# Patient Record
Sex: FEMALE | Race: WHITE | NOT HISPANIC OR LATINO | URBAN - METROPOLITAN AREA
[De-identification: names, ages, dates, MRNs, and addresses within clinical notes are randomized per-mention and may not be internally consistent; named-entity substitution may affect disease eponyms.]

---

## 2023-02-20 LAB
ALANINE AMINOTRANSFERASE (SGPT) (U/L) IN SER/PLAS: 34 U/L (ref 7–45)
ALBUMIN (G/DL) IN SER/PLAS: 4.6 G/DL (ref 3.4–5)
ALKALINE PHOSPHATASE (U/L) IN SER/PLAS: 89 U/L (ref 33–136)
ASPARTATE AMINOTRANSFERASE (SGOT) (U/L) IN SER/PLAS: 19 U/L (ref 9–39)
BILIRUBIN DIRECT (MG/DL) IN SER/PLAS: 0.2 MG/DL (ref 0–0.3)
BILIRUBIN TOTAL (MG/DL) IN SER/PLAS: 1.2 MG/DL (ref 0–1.2)
PROTEIN TOTAL: 7.6 G/DL (ref 6.4–8.2)

## 2023-02-21 LAB — HEPATITIS B VIRUS SURFACE AG PRESENCE IN SERUM: NONREACTIVE

## 2023-02-23 LAB
HBV DNA PCR COMMENT: NORMAL
HBV DNA QUANT PCR IU/ML: NOT DETECTED IU/ML
HBV DNA QUANT PCR LOG: NORMAL LOG IU/ML

## 2023-07-27 LAB
AB TO ADALIMUMAB(ATA) CONC.: 3.1 U/ML
ADA RESULTS: ABNORMAL
ADALIMUMAB(ADA) CONC.: 3.4 UG/ML

## 2023-08-07 LAB
ALANINE AMINOTRANSFERASE (SGPT) (U/L) IN SER/PLAS: 41 U/L (ref 7–45)
ALBUMIN (G/DL) IN SER/PLAS: 4.3 G/DL (ref 3.4–5)
ALKALINE PHOSPHATASE (U/L) IN SER/PLAS: 89 U/L (ref 33–136)
ASPARTATE AMINOTRANSFERASE (SGOT) (U/L) IN SER/PLAS: 26 U/L (ref 9–39)
BILIRUBIN DIRECT (MG/DL) IN SER/PLAS: 0.2 MG/DL (ref 0–0.3)
BILIRUBIN TOTAL (MG/DL) IN SER/PLAS: 1.2 MG/DL (ref 0–1.2)
HEPATITIS B VIRUS SURFACE AG PRESENCE IN SERUM: NONREACTIVE
PROTEIN TOTAL: 7.2 G/DL (ref 6.4–8.2)

## 2023-10-13 DIAGNOSIS — R79.89 ELEVATED LIVER FUNCTION TESTS: Primary | ICD-10-CM

## 2023-10-13 PROBLEM — L02.411 ABSCESS OF RIGHT AXILLA: Status: ACTIVE | Noted: 2023-10-13

## 2023-10-13 PROBLEM — K82.8 BILIARY DYSKINESIA: Status: ACTIVE | Noted: 2023-10-13

## 2023-10-13 PROBLEM — K76.0 HEPATIC STEATOSIS: Status: ACTIVE | Noted: 2023-10-13

## 2023-10-13 PROBLEM — E66.9 OBESITY: Status: ACTIVE | Noted: 2023-10-13

## 2023-10-13 PROBLEM — R74.01 ELEVATED ALT MEASUREMENT: Status: ACTIVE | Noted: 2023-10-13

## 2023-10-13 PROBLEM — L02.412 ABSCESS OF AXILLA, LEFT: Status: ACTIVE | Noted: 2023-10-13

## 2023-10-13 PROBLEM — H69.90 EUSTACHIAN TUBE DYSFUNCTION: Status: ACTIVE | Noted: 2023-10-13

## 2023-10-13 PROBLEM — H90.3 BILATERAL SENSORINEURAL HEARING LOSS: Status: ACTIVE | Noted: 2023-10-13

## 2023-10-13 PROBLEM — K14.6 TONGUE SORE: Status: ACTIVE | Noted: 2023-10-13

## 2023-10-13 PROBLEM — H91.90 HEARING LOSS: Status: ACTIVE | Noted: 2023-10-13

## 2023-10-13 PROBLEM — E55.9 VITAMIN D DEFICIENCY: Status: ACTIVE | Noted: 2023-10-13

## 2023-10-13 PROBLEM — H92.09 OTALGIA: Status: ACTIVE | Noted: 2023-10-13

## 2023-10-13 PROBLEM — J32.9 CHRONIC SINUSITIS: Status: ACTIVE | Noted: 2023-10-13

## 2023-10-13 PROBLEM — R06.83 SNORING: Status: ACTIVE | Noted: 2023-10-13

## 2023-10-13 PROBLEM — R59.1 LYMPHADENOPATHY: Status: ACTIVE | Noted: 2023-10-13

## 2023-10-13 PROBLEM — B37.2 SKIN CANDIDIASIS: Status: ACTIVE | Noted: 2023-10-13

## 2023-10-13 PROBLEM — M22.41 CHONDROMALACIA OF RIGHT PATELLA: Status: ACTIVE | Noted: 2023-10-13

## 2023-10-13 PROBLEM — R04.0 BLEEDING NOSE: Status: ACTIVE | Noted: 2023-10-13

## 2023-10-13 PROBLEM — R73.01 ELEVATED FASTING BLOOD SUGAR: Status: ACTIVE | Noted: 2023-10-13

## 2023-10-13 PROBLEM — H93.13 BILATERAL TINNITUS: Status: ACTIVE | Noted: 2023-10-13

## 2023-10-13 RX ORDER — MULTIVITAMIN
1 TABLET ORAL DAILY
COMMUNITY
Start: 2022-01-06

## 2023-10-13 RX ORDER — LANOLIN ALCOHOL/MO/W.PET/CERES
1 CREAM (GRAM) TOPICAL DAILY
COMMUNITY
Start: 2022-01-06

## 2023-10-13 RX ORDER — ELECTROLYTES/DEXTROSE
SOLUTION, ORAL ORAL
COMMUNITY
Start: 2022-01-06

## 2023-10-13 RX ORDER — OXYMETAZOLINE HYDROCHLORIDE 0.05 G/100ML
2 SPRAY NASAL 2 TIMES DAILY PRN
COMMUNITY
Start: 2023-06-05

## 2023-10-13 RX ORDER — FLUTICASONE PROPIONATE 0.5 MG/G
CREAM TOPICAL 2 TIMES DAILY PRN
COMMUNITY

## 2023-10-13 RX ORDER — OXYCODONE AND ACETAMINOPHEN 5; 325 MG/1; MG/1
1 TABLET ORAL EVERY 6 HOURS
COMMUNITY
Start: 2017-07-14

## 2023-10-13 RX ORDER — TRIAMCINOLONE ACETONIDE 1 MG/G
CREAM TOPICAL
COMMUNITY
Start: 2023-09-13

## 2023-10-13 RX ORDER — ERGOCALCIFEROL 1.25 MG/1
50000 CAPSULE ORAL
COMMUNITY
Start: 2017-09-27

## 2023-10-14 ENCOUNTER — LAB (OUTPATIENT)
Dept: LAB | Facility: LAB | Age: 63
End: 2023-10-14
Payer: MEDICARE

## 2023-10-14 DIAGNOSIS — R79.89 ELEVATED LIVER FUNCTION TESTS: ICD-10-CM

## 2023-10-14 LAB
ALBUMIN SERPL BCP-MCNC: 4.3 G/DL (ref 3.4–5)
ALP SERPL-CCNC: 100 U/L (ref 33–136)
ALT SERPL W P-5'-P-CCNC: 41 U/L (ref 7–45)
AST SERPL W P-5'-P-CCNC: 23 U/L (ref 9–39)
BILIRUB DIRECT SERPL-MCNC: 0.2 MG/DL (ref 0–0.3)
BILIRUB SERPL-MCNC: 1.1 MG/DL (ref 0–1.2)
HBV SURFACE AG SERPL QL IA: NONREACTIVE
PROT SERPL-MCNC: 7.3 G/DL (ref 6.4–8.2)

## 2023-10-14 PROCEDURE — 87340 HEPATITIS B SURFACE AG IA: CPT

## 2023-10-14 PROCEDURE — 80076 HEPATIC FUNCTION PANEL: CPT

## 2023-10-14 PROCEDURE — 80145 DRUG ASSAY ADALIMUMAB: CPT

## 2023-10-14 PROCEDURE — 36415 COLL VENOUS BLD VENIPUNCTURE: CPT

## 2023-10-14 PROCEDURE — 87517 HEPATITIS B DNA QUANT: CPT

## 2023-10-16 ENCOUNTER — OFFICE VISIT (OUTPATIENT)
Dept: ENDOCRINOLOGY | Facility: CLINIC | Age: 63
End: 2023-10-16
Payer: MEDICARE

## 2023-10-16 VITALS
BODY MASS INDEX: 39.38 KG/M2 | WEIGHT: 214 LBS | SYSTOLIC BLOOD PRESSURE: 114 MMHG | DIASTOLIC BLOOD PRESSURE: 68 MMHG | HEIGHT: 62 IN

## 2023-10-16 DIAGNOSIS — R53.83 FATIGUE, UNSPECIFIED TYPE: ICD-10-CM

## 2023-10-16 DIAGNOSIS — E88.810 METABOLIC SYNDROME: Primary | ICD-10-CM

## 2023-10-16 DIAGNOSIS — E78.5 DYSLIPIDEMIA: ICD-10-CM

## 2023-10-16 DIAGNOSIS — K76.0 HEPATIC STEATOSIS: ICD-10-CM

## 2023-10-16 PROCEDURE — 99205 OFFICE O/P NEW HI 60 MIN: CPT | Performed by: INTERNAL MEDICINE

## 2023-10-16 PROCEDURE — 1125F AMNT PAIN NOTED PAIN PRSNT: CPT | Performed by: INTERNAL MEDICINE

## 2023-10-16 RX ORDER — POTASSIUM CHLORIDE 750 MG/1
10 TABLET, FILM COATED, EXTENDED RELEASE ORAL 2 TIMES DAILY
Qty: 60 TABLET | Refills: 5 | Status: SHIPPED | OUTPATIENT
Start: 2023-10-16 | End: 2023-10-17 | Stop reason: SDUPTHER

## 2023-10-16 NOTE — PROGRESS NOTES
Patient ID: Arcelia Cook  New Patient Visit (Endocrine consult. Here for weight loss.).  HPI  The patient is referred for evaluation for PSMF.    This is a 63-year-old female who has had a problem with weight for most of her life.    She has lost weight in the past with a more balanced diet coupled with phentermine.    Weight is complicated by likely insulin resistance with hunger postprandial fatigue fluid retention dyslipidemia hepatic steatosis.    She avoids skipping meals.  Does stress eating, boredom eating, late night eating, between meals eating and snacking.    She is not currently exercising.    She has a past history of hidradenitis suppurativa hepatitis kidney stones cholecystectomy no gout.    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    Family history positive for diabetes in her father sister.  Thyroid possibly in her mother.            ROS  Comprehensive review of systems is negative.    Objective   Physical Exam  Height 5 foot 1.5 weight 214 BMI 39.8    Alert and oriented x3  In no distress  No focal neurologic deficits  No supraclavicular, or dorsal fat  No purple striae  Integument intact  Eyes normal  ENT normal. No adenopathy  Thyroid palpable and normal. No nodules  Chest clear to auscultation  Heart sounds are normal  Abdomen nontender. Bowel sounds normal. No organomegaly  Feet are okay  Reflexes normal with normal return    Assessment/Plan     1.  Likely insulin resistance  2.  Dyslipidemia  3.  Hepatic steatosis  4.  Fatigue    We discussed the likelihood of insulin resistance its pathophysiology and its impact.    We discussed risks, benefits and alternatives to the protein sparing modified fast.    We discussed risks, including, but not limited to the potential for electrolyte imbalance which could lead to cardiac arrhythmia.  The high protein nature of the diet increasing levels of uric acid which could  lead to nephrolithiasis or gout.  The low fiber nature of the diet increasing risk for constipation.  Risk for gallstones, cold intolerance, excess skin seen with significant weight loss.      We discussed the benefits of weight loss regarding co-morbid conditions.      We also discussed alternatives to the program, including a balanced calorie restricted approach coupled with exercise and she would like to proceed.  We'll therefore set her up with the nutritionist for dietary instruction. When she starts the program, we'll add in potassium, as well as the other supplements except for the calcium and keep a close eye on electrolytes.    She'll follow up with me in 1 month, sooner as needed.

## 2023-10-17 DIAGNOSIS — E88.810 METABOLIC SYNDROME: ICD-10-CM

## 2023-10-17 LAB
ADALIMUMAB NAB TITR SER BIOASSAY: NOT DETECTED {TITER}
ADALIMUMAB SERPL-MCNC: 6.88 UG/ML
PATHOLOGY STUDY: NORMAL

## 2023-10-17 RX ORDER — POTASSIUM CHLORIDE 750 MG/1
10 TABLET, FILM COATED, EXTENDED RELEASE ORAL 2 TIMES DAILY
Qty: 60 TABLET | Refills: 5 | Status: SHIPPED | OUTPATIENT
Start: 2023-10-17 | End: 2024-10-16

## 2023-10-19 LAB
HBV DNA SERPL NAA+PROBE-ACNC: NOT DETECTED [IU]/ML
HBV DNA SERPL NAA+PROBE-LOG IU: NORMAL {LOG_IU}/ML

## 2023-10-20 NOTE — RESULT ENCOUNTER NOTE
Discussed with patient. She will review meds with dermatology. Repeat hep B screening in 3 mo while on immunosuppressant

## 2023-11-16 ENCOUNTER — OFFICE VISIT (OUTPATIENT)
Dept: ENDOCRINOLOGY | Facility: CLINIC | Age: 63
End: 2023-11-16
Payer: MEDICARE

## 2023-11-16 VITALS — SYSTOLIC BLOOD PRESSURE: 124 MMHG | BODY MASS INDEX: 37.74 KG/M2 | DIASTOLIC BLOOD PRESSURE: 78 MMHG | WEIGHT: 203 LBS

## 2023-11-16 DIAGNOSIS — E78.5 DYSLIPIDEMIA: ICD-10-CM

## 2023-11-16 DIAGNOSIS — E88.810 METABOLIC SYNDROME: Primary | ICD-10-CM

## 2023-11-16 DIAGNOSIS — K76.0 HEPATIC STEATOSIS: ICD-10-CM

## 2023-11-16 PROCEDURE — 1159F MED LIST DOCD IN RCRD: CPT | Performed by: INTERNAL MEDICINE

## 2023-11-16 PROCEDURE — 99214 OFFICE O/P EST MOD 30 MIN: CPT | Performed by: INTERNAL MEDICINE

## 2023-11-16 PROCEDURE — 1125F AMNT PAIN NOTED PAIN PRSNT: CPT | Performed by: INTERNAL MEDICINE

## 2023-11-16 NOTE — PROGRESS NOTES
Patient ID: Arcelia Cook is a 63 y.o. female who presents for Follow-up.  HPI  The patient comes in for follow up.    She is on PSMF for management of insulin resistance with postprandial fatigue hunger fluid retention dyslipidemia and hepatic steatosis.    She started the diet October 25 and has been on and off track.    She has been working on exercise.    She is now retired.    She does states she has been eating bigger portion size.    Aside from the supplements she has had no changes in her medical regimen.    Physically she has no other complaints.    ROS  Comprehensive review of systems is negative.    Objective   Physical Exam  Visit Vitals  /78      Vitals:    11/16/23 0923   Weight: 92.1 kg (203 lb)      Body mass index is 37.74 kg/m².      Weight 203 down 11 pounds    Eyes normal  ENT normal. No adenopathy  Thyroid palpable and normal. No nodules  Chest clear to auscultation  Heart sounds are normal  Abdomen nontender. Bowel sounds normal. No organomegaly  Feet are okay    Assessment/Plan     1.  Likely insulin resistance  2.  Dyslipidemia  3.  Hepatic steatosis  4.  Fatigue    She is going to work to tighten things back up.  We discussed strategies for success. Planning ahead, taking things a week at a time and planning the week ahead of time.    We discussed strategies for the holidays.    She will continue her current regimen.    She will work on exercise.    We will hold on blood work today.    Follow up with the nutritionist in 1 month, me in 2 months, sooner as needed.

## 2023-12-05 ENCOUNTER — NUTRITION (OUTPATIENT)
Dept: ENDOCRINOLOGY | Facility: CLINIC | Age: 63
End: 2023-12-05
Payer: MEDICARE

## 2023-12-05 VITALS — WEIGHT: 202 LBS | BODY MASS INDEX: 37.55 KG/M2

## 2023-12-05 NOTE — PROGRESS NOTES
"Patient presents for PSMF in-person followup visit. Her current weight is 202lbs. She admits to getting off track with the diet after  since some cravings came back. She does have an upcoming holiday party this weekend - we discussed fully resuming the diet after the party to ensure she is back in ketosis before [place]. When testing ketones prior to holiday, she was showing moderate levels. Continues taking required supplements. Exercises with friends by walking three times weekly for 1 hour at fitness center. Reviewed meal, snack and drink options including beef stick snacks. Discussed importance of avoiding those items even if labeled \"keto friendly\" as they may have larger amounts of fat. Plans to continue PSMF throughout the holidays.     Wt Readings from Last 5 Encounters:   23 91.6 kg (202 lb)   23 92.1 kg (203 lb)   10/16/23 97.1 kg (214 lb)   22 97.1 kg (214 lb)   22 97.8 kg (215 lb 8 oz)     IBW: 48 kg  Protein need calculated (1.5g/kg IBW) = 72g  Protein intake recommended: 77g = 11oz     Daily Meal Plan:   Morning:    3 oz protein          Afternoon:  4 oz protein + 1 vegetable from the list   Evenin oz protein + 1 vegetable from the list   Fluids:        64-96oz per day     Recommended supplements:  Potassium as directed with breakfast and dinner (prescription)  Fiber capsules 1-2 times daily or sugar-free fiber powder (as tolerated)   Colace (stool softener) as needed, up to 4 capsules/day  Calcium: 500-600 mg, pill form twice daily with breakfast or lunch AND dinner  Multivitamin tablet or capsule per day that is low in iron (i.e. Centrum Silver)  Magnesium: 200-250 mg, pill form with dinner or at bedtime  Bouillon cube one to two times per day as needed, if feeling dizzy   Beginning with the fourth morning of this diet, test urine every morning using Ketostix for ketone (fat) breakdown.   "

## 2024-01-04 ENCOUNTER — OFFICE VISIT (OUTPATIENT)
Dept: ENDOCRINOLOGY | Facility: CLINIC | Age: 64
End: 2024-01-04
Payer: MEDICARE

## 2024-01-04 VITALS — WEIGHT: 203 LBS | DIASTOLIC BLOOD PRESSURE: 78 MMHG | SYSTOLIC BLOOD PRESSURE: 134 MMHG | BODY MASS INDEX: 37.74 KG/M2

## 2024-01-04 DIAGNOSIS — E88.810 METABOLIC SYNDROME: Primary | ICD-10-CM

## 2024-01-04 DIAGNOSIS — K76.0 HEPATIC STEATOSIS: ICD-10-CM

## 2024-01-04 DIAGNOSIS — E78.5 DYSLIPIDEMIA: ICD-10-CM

## 2024-01-04 PROCEDURE — 1125F AMNT PAIN NOTED PAIN PRSNT: CPT | Performed by: INTERNAL MEDICINE

## 2024-01-04 PROCEDURE — 99214 OFFICE O/P EST MOD 30 MIN: CPT | Performed by: INTERNAL MEDICINE

## 2024-01-04 PROCEDURE — 1159F MED LIST DOCD IN RCRD: CPT | Performed by: INTERNAL MEDICINE

## 2024-01-04 RX ORDER — METFORMIN HYDROCHLORIDE 1000 MG/1
1000 TABLET ORAL
Qty: 60 TABLET | Refills: 5 | Status: SHIPPED | OUTPATIENT
Start: 2024-01-04 | End: 2025-01-03

## 2024-01-04 NOTE — PROGRESS NOTES
Patient ID: Arcelia Cook is a 63 y.o. female who presents for Follow-up.  HPI  The patient comes in for follow up.    She has been off track of [facility] for management of insulin resistance with postprandial fatigue hunger fluid retention dyslipidemia hepatic steatosis.    She continues to struggle with hunger and also states that making 2 separate meals in her household has been a challenge.    Her portion size has also been an issue.    She is back on track over the last week or so.    Physically she has no other complaints.    ROS  Comprehensive review of systems is negative.    Objective   Physical Exam  Visit Vitals  /78      Vitals:    01/04/24 0941   Weight: 92.1 kg (203 lb)      Body mass index is 37.74 kg/m².      Weight 203 stable    Eyes normal  ENT normal. No adenopathy  Thyroid palpable and normal. No nodules  Chest clear to auscultation  Heart sounds are normal  Abdomen nontender. Bowel sounds normal. No organomegaly  Feet are okay    Assessment/Plan     1.  Insulin resistance  2.  Dyslipidemia  3.  Fatigue  4.  Hepatic steatosis    She is going to work to tighten up her diet.  We discussed strategies for success. Planning ahead, taking things a week at a time and planning the week ahead of time.    We discussed insulin resistance again is pathophysiology and its impact.    We discussed weight loss medication options including risk benefits and alternatives.    Will add in Metformin starting at 500 mg and working up to 1000 mg BID per protocol.    Follow up with the nutritionist in 1 month, me in 2 months, sooner as needed.

## 2024-02-02 ENCOUNTER — TELEPHONE (OUTPATIENT)
Dept: GASTROENTEROLOGY | Facility: CLINIC | Age: 64
End: 2024-02-02
Payer: MEDICARE

## 2024-02-02 ENCOUNTER — LAB (OUTPATIENT)
Dept: LAB | Facility: LAB | Age: 64
End: 2024-02-02
Payer: MEDICARE

## 2024-02-02 DIAGNOSIS — R74.01 ELEVATED ALT MEASUREMENT: ICD-10-CM

## 2024-02-02 DIAGNOSIS — R74.01 ELEVATED ALT MEASUREMENT: Primary | ICD-10-CM

## 2024-02-02 LAB
ALBUMIN SERPL BCP-MCNC: 4.2 G/DL (ref 3.4–5)
ALP SERPL-CCNC: 86 U/L (ref 33–136)
ALT SERPL W P-5'-P-CCNC: 37 U/L (ref 7–45)
AST SERPL W P-5'-P-CCNC: 19 U/L (ref 9–39)
BILIRUB DIRECT SERPL-MCNC: 0.1 MG/DL (ref 0–0.3)
BILIRUB SERPL-MCNC: 0.9 MG/DL (ref 0–1.2)
HBV CORE AB SER QL: REACTIVE
HBV SURFACE AG SERPL QL IA: NONREACTIVE
PROT SERPL-MCNC: 7.3 G/DL (ref 6.4–8.2)

## 2024-02-02 PROCEDURE — 86704 HEP B CORE ANTIBODY TOTAL: CPT

## 2024-02-02 PROCEDURE — 87340 HEPATITIS B SURFACE AG IA: CPT

## 2024-02-02 PROCEDURE — 80076 HEPATIC FUNCTION PANEL: CPT

## 2024-02-02 PROCEDURE — 87517 HEPATITIS B DNA QUANT: CPT

## 2024-02-02 PROCEDURE — 36415 COLL VENOUS BLD VENIPUNCTURE: CPT

## 2024-02-02 PROCEDURE — 87350 HEPATITIS BE AG IA: CPT

## 2024-02-02 NOTE — TELEPHONE ENCOUNTER
Arcelia stopped by the office asking for the standing lab orders to be ordered again, they don't seem to be in epic. thanks

## 2024-02-05 LAB — HBV E AG SERPL QL IA: NEGATIVE

## 2024-02-06 ENCOUNTER — TELEMEDICINE CLINICAL SUPPORT (OUTPATIENT)
Dept: ENDOCRINOLOGY | Facility: CLINIC | Age: 64
End: 2024-02-06
Payer: MEDICARE

## 2024-02-06 VITALS — WEIGHT: 200 LBS | BODY MASS INDEX: 37.18 KG/M2

## 2024-02-06 NOTE — PROGRESS NOTES
[facility]. She has been off track with the diet since [place] trip in mid-January. Current weight of 200lbs, down 2lbs total since last visit in December. She has been trying to make healthier food choices overall. She tried her best to stay on track during holiday season. She does admit to GI distress with taking Metformin, encouraged to discuss with Dr. Herrera to potentially trial ER version. At this time, patient feels like a more liberalized approach would benefit her vs PSMF. She will discuss with Dr. Herrera and make a decision regarding her diet, depending on if she will start another medication. We will hold off on PSMF for the time being.    Wt Readings from Last 5 Encounters:   24 90.7 kg (200 lb)   24 92.1 kg (203 lb)   23 91.6 kg (202 lb)   23 92.1 kg (203 lb)   10/16/23 97.1 kg (214 lb)     IBW: 48 kg  Protein need calculated (1.5g/kg IBW) = 72g  Protein intake recommended: 77g = 11oz     Daily Meal Plan:   Morning:    3 oz protein          Afternoon:  4 oz protein + 1 vegetable from the list   Evenin oz protein + 1 vegetable from the list   Fluids:        64-96oz per day     Recommended supplements:  Potassium as directed with breakfast and dinner (prescription)  Fiber capsules 1-2 times daily or sugar-free fiber powder (as tolerated)   Colace (stool softener) as needed, up to 4 capsules/day  Calcium: 500-600 mg, pill form twice daily with breakfast or lunch AND dinner  Multivitamin tablet or capsule per day that is low in iron (i.e. Centrum Silver)  Magnesium: 200-250 mg, pill form with dinner or at bedtime  Bouillon cube one to two times per day as needed, if feeling dizzy   Beginning with the fourth morning of this diet, test urine every morning using Ketostix for ketone (fat) breakdown.

## 2024-03-04 ENCOUNTER — OFFICE VISIT (OUTPATIENT)
Dept: ENDOCRINOLOGY | Facility: CLINIC | Age: 64
End: 2024-03-04
Payer: MEDICARE

## 2024-03-04 VITALS
SYSTOLIC BLOOD PRESSURE: 122 MMHG | HEART RATE: 74 BPM | WEIGHT: 203 LBS | DIASTOLIC BLOOD PRESSURE: 72 MMHG | BODY MASS INDEX: 37.74 KG/M2

## 2024-03-04 DIAGNOSIS — K76.0 HEPATIC STEATOSIS: ICD-10-CM

## 2024-03-04 DIAGNOSIS — E88.810 METABOLIC SYNDROME: Primary | ICD-10-CM

## 2024-03-04 DIAGNOSIS — E78.5 DYSLIPIDEMIA: ICD-10-CM

## 2024-03-04 DIAGNOSIS — E88.810 METABOLIC SYNDROME: ICD-10-CM

## 2024-03-04 PROCEDURE — 1125F AMNT PAIN NOTED PAIN PRSNT: CPT | Performed by: INTERNAL MEDICINE

## 2024-03-04 PROCEDURE — 99214 OFFICE O/P EST MOD 30 MIN: CPT | Performed by: INTERNAL MEDICINE

## 2024-03-04 PROCEDURE — 1159F MED LIST DOCD IN RCRD: CPT | Performed by: INTERNAL MEDICINE

## 2024-03-04 RX ORDER — PHENTERMINE HYDROCHLORIDE 37.5 MG/1
37.5 CAPSULE ORAL
Qty: 30 CAPSULE | Refills: 2 | Status: SHIPPED | OUTPATIENT
Start: 2024-03-04 | End: 2024-03-05 | Stop reason: SDUPTHER

## 2024-03-04 RX ORDER — PHENTERMINE HYDROCHLORIDE 37.5 MG/1
37.5 CAPSULE ORAL
Qty: 30 CAPSULE | Refills: 2 | Status: SHIPPED | OUTPATIENT
Start: 2024-03-04 | End: 2024-03-04 | Stop reason: SDUPTHER

## 2024-03-04 NOTE — PROGRESS NOTES
Patient ID: Arcelia Cook is a 63 y.o. female who presents for Follow-up.  HPI  The patient comes in for follow up.    She has been off track of [facility] for management of insulin resistance with postprandial fatigue hunger fluid retention dyslipidemia hepatic steatosis.    She went on a cruise and gained up to 220 pounds since tighten things back up.    She continues to struggle with hunger.    She has been eating in the middle of the night.    Her portion size has also been an issue.    She tried metformin but did not tolerate it.    Physically she has no other complaints.    ROS  Comprehensive review of systems is negative.    Objective   Physical Exam  Visit Vitals  /72   Pulse 74      Vitals:    03/04/24 1105   Weight: 92.1 kg (203 lb)      Body mass index is 37.74 kg/m².      Weight 203 stable    Eyes normal  ENT normal. No adenopathy  Thyroid palpable and normal. No nodules  Chest clear to auscultation  Heart sounds are normal  Abdomen nontender. Bowel sounds normal. No organomegaly  Feet are okay    Current Outpatient Medications   Medication Sig Dispense Refill    biotin 5 mg capsule Take as directed      cholecalciferol, vitamin D3, (VITAMIN D3 ORAL) Take 1 tablet by mouth once daily.      ergocalciferol (Vitamin D-2) 1.25 MG (45021 UT) capsule Take 1 capsule (50,000 Units) by mouth every 14 (fourteen) days.      fluticasone (Cutivate) 0.05 % cream Apply topically 2 times a day as needed.      metFORMIN (Glucophage) 1,000 mg tablet Take 1 tablet (1,000 mg) by mouth 2 times a day with meals. 60 tablet 5    multivitamin tablet Take 1 tablet by mouth once daily.      oxyCODONE-acetaminophen (Percocet) 5-325 mg tablet Take 1 tablet by mouth every 6 hours.      oxymetazoline (Afrin, oxymetazoline,) 0.05 % nasal spray Administer 2 sprays into each nostril 2 times a day as needed. Do not exceed use for more than 3 days      potassium chloride CR 10 mEq ER tablet Take 1 tablet (10 mEq) by mouth 2 times a day.  Do not crush, chew, or split. 60 tablet 5    pyridoxine (Vitamin B-6) 50 mg tablet Take 1 tablet (50 mg) by mouth once daily.      triamcinolone (Kenalog) 0.1 % cream        No current facility-administered medications for this visit.       Assessment/Plan     1.  Insulin resistance  2.  Dyslipidemia  3.  Fatigue  4.  Hepatic steatosis    She will transition to a more balanced diet.    We discussed risk benefits and alternatives to weight loss medication.    Will prescribe phentermine 37.5 mg for the next 3 months with a goal of at least 5% weight loss.    I have personally reviewed the OARRS report for this patient. This report is scanned into the electronic medical record. I consider the risks of abuse, dependence, addiction and diversion. I believe that is clinically appropriate for this patient to be prescribed this medication.    She will follow-up with me in 3 months sooner as needed.

## 2024-03-05 DIAGNOSIS — E88.810 METABOLIC SYNDROME: ICD-10-CM

## 2024-03-05 RX ORDER — PHENTERMINE HYDROCHLORIDE 37.5 MG/1
37.5 CAPSULE ORAL
Qty: 30 CAPSULE | Refills: 2 | Status: SHIPPED | OUTPATIENT
Start: 2024-03-05 | End: 2024-06-04 | Stop reason: SDUPTHER

## 2024-06-04 ENCOUNTER — LAB (OUTPATIENT)
Dept: LAB | Facility: LAB | Age: 64
End: 2024-06-04
Payer: MEDICARE

## 2024-06-04 ENCOUNTER — OFFICE VISIT (OUTPATIENT)
Dept: ENDOCRINOLOGY | Facility: CLINIC | Age: 64
End: 2024-06-04
Payer: MEDICARE

## 2024-06-04 VITALS
WEIGHT: 192 LBS | HEART RATE: 68 BPM | SYSTOLIC BLOOD PRESSURE: 122 MMHG | BODY MASS INDEX: 35.69 KG/M2 | DIASTOLIC BLOOD PRESSURE: 74 MMHG

## 2024-06-04 DIAGNOSIS — E88.810 METABOLIC SYNDROME: ICD-10-CM

## 2024-06-04 DIAGNOSIS — E08.44 DIABETES MELLITUS DUE TO UNDERLYING CONDITION WITH DIABETIC AMYOTROPHY, WITHOUT LONG-TERM CURRENT USE OF INSULIN (MULTI): ICD-10-CM

## 2024-06-04 DIAGNOSIS — E08.3531: ICD-10-CM

## 2024-06-04 DIAGNOSIS — E78.5 DYSLIPIDEMIA: ICD-10-CM

## 2024-06-04 DIAGNOSIS — E88.810 METABOLIC SYNDROME: Primary | ICD-10-CM

## 2024-06-04 DIAGNOSIS — R53.83 FATIGUE, UNSPECIFIED TYPE: ICD-10-CM

## 2024-06-04 DIAGNOSIS — K76.0 HEPATIC STEATOSIS: ICD-10-CM

## 2024-06-04 LAB
ANION GAP SERPL CALC-SCNC: 13 MMOL/L (ref 10–20)
BUN SERPL-MCNC: 14 MG/DL (ref 6–23)
CALCIUM SERPL-MCNC: 9.2 MG/DL (ref 8.6–10.6)
CHLORIDE SERPL-SCNC: 106 MMOL/L (ref 98–107)
CO2 SERPL-SCNC: 29 MMOL/L (ref 21–32)
CREAT SERPL-MCNC: 0.56 MG/DL (ref 0.5–1.05)
EGFRCR SERPLBLD CKD-EPI 2021: >90 ML/MIN/1.73M*2
EST. AVERAGE GLUCOSE BLD GHB EST-MCNC: 85 MG/DL
GLUCOSE SERPL-MCNC: 94 MG/DL (ref 74–99)
HBA1C MFR BLD: 4.6 %
POTASSIUM SERPL-SCNC: 4.1 MMOL/L (ref 3.5–5.3)
SODIUM SERPL-SCNC: 144 MMOL/L (ref 136–145)
TSH SERPL-ACNC: 0.71 MIU/L (ref 0.44–3.98)

## 2024-06-04 PROCEDURE — 80048 BASIC METABOLIC PNL TOTAL CA: CPT

## 2024-06-04 PROCEDURE — 3078F DIAST BP <80 MM HG: CPT | Performed by: INTERNAL MEDICINE

## 2024-06-04 PROCEDURE — 36415 COLL VENOUS BLD VENIPUNCTURE: CPT

## 2024-06-04 PROCEDURE — 3074F SYST BP LT 130 MM HG: CPT | Performed by: INTERNAL MEDICINE

## 2024-06-04 PROCEDURE — 83036 HEMOGLOBIN GLYCOSYLATED A1C: CPT

## 2024-06-04 PROCEDURE — 99214 OFFICE O/P EST MOD 30 MIN: CPT | Performed by: INTERNAL MEDICINE

## 2024-06-04 PROCEDURE — 84443 ASSAY THYROID STIM HORMONE: CPT

## 2024-06-04 RX ORDER — PHENTERMINE HYDROCHLORIDE 37.5 MG/1
37.5 CAPSULE ORAL
Qty: 30 CAPSULE | Refills: 2 | Status: SHIPPED | OUTPATIENT
Start: 2024-06-04 | End: 2024-09-02

## 2024-06-04 NOTE — PROGRESS NOTES
Patient ID: Arcelia Cook is a 64 y.o. female who presents for Follow-up.  HPI  The patient comes in for follow up.    She tried PSMF for management of insulin resistance with postprandial fatigue hunger fluid retention dyslipidemia hepatic steatosis.    She has been following a more balanced approach and we added phentermine 37.5 mg at the last appointment.    She states that has helped her appetite.    She is had no difficulty tolerating it.    She tried metformin but did not tolerate it in the past.    Physically she has no complaints.    ROS  Comprehensive review of systems is negative.    Objective   Physical Exam  There were no vitals taken for this visit.   Vitals:    06/04/24 1100   Weight: 87.1 kg (192 lb)      Body mass index is 35.69 kg/m².      Weight 192 down 11 pounds for total of 5.4%    Eyes normal  ENT normal. No adenopathy  Thyroid palpable and normal. No nodules  Chest clear to auscultation  Heart sounds are normal  Abdomen nontender. Bowel sounds normal. No organomegaly  Feet are okay    Current Outpatient Medications   Medication Sig Dispense Refill    biotin 5 mg capsule Take as directed      cholecalciferol, vitamin D3, (VITAMIN D3 ORAL) Take 1 tablet by mouth once daily.      ergocalciferol (Vitamin D-2) 1.25 MG (93313 UT) capsule Take 1 capsule (50,000 Units) by mouth every 14 (fourteen) days.      fluticasone (Cutivate) 0.05 % cream Apply topically 2 times a day as needed.      metFORMIN (Glucophage) 1,000 mg tablet Take 1 tablet (1,000 mg) by mouth 2 times a day with meals. 60 tablet 5    multivitamin tablet Take 1 tablet by mouth once daily.      oxyCODONE-acetaminophen (Percocet) 5-325 mg tablet Take 1 tablet by mouth every 6 hours.      oxymetazoline (Afrin, oxymetazoline,) 0.05 % nasal spray Administer 2 sprays into each nostril 2 times a day as needed. Do not exceed use for more than 3 days      phentermine 37.5 mg capsule Take 1 capsule (37.5 mg) by mouth once daily in the morning.  Take before meals. 30 capsule 2    potassium chloride CR 10 mEq ER tablet Take 1 tablet (10 mEq) by mouth 2 times a day. Do not crush, chew, or split. 60 tablet 5    pyridoxine (Vitamin B-6) 50 mg tablet Take 1 tablet (50 mg) by mouth once daily.      triamcinolone (Kenalog) 0.1 % cream        No current facility-administered medications for this visit.       Assessment/Plan     1.  Insulin resistance  2.  Dyslipidemia  3.  Fatigue  4.  Hepatic steatosis    She will continue on a more balanced approach.    She will continue on the phentermine for the next 3 months.    She will work on diet and exercise.    Will check hemoglobin A1c BMP and TSH today.    She will follow-up with me in 3 months sooner as needed.

## 2024-09-05 ENCOUNTER — APPOINTMENT (OUTPATIENT)
Dept: ENDOCRINOLOGY | Facility: CLINIC | Age: 64
End: 2024-09-05
Payer: MEDICARE

## 2024-11-22 ENCOUNTER — APPOINTMENT (OUTPATIENT)
Dept: ENDOCRINOLOGY | Facility: CLINIC | Age: 64
End: 2024-11-22
Payer: MEDICARE

## 2024-11-22 VITALS
DIASTOLIC BLOOD PRESSURE: 78 MMHG | HEART RATE: 74 BPM | SYSTOLIC BLOOD PRESSURE: 122 MMHG | BODY MASS INDEX: 35.13 KG/M2 | WEIGHT: 189 LBS

## 2024-11-22 DIAGNOSIS — E88.810 METABOLIC SYNDROME: Primary | ICD-10-CM

## 2024-11-22 DIAGNOSIS — E78.5 DYSLIPIDEMIA: ICD-10-CM

## 2024-11-22 DIAGNOSIS — K76.0 HEPATIC STEATOSIS: ICD-10-CM

## 2024-11-22 DIAGNOSIS — R53.83 FATIGUE, UNSPECIFIED TYPE: ICD-10-CM

## 2024-11-22 PROCEDURE — 99214 OFFICE O/P EST MOD 30 MIN: CPT | Performed by: INTERNAL MEDICINE

## 2024-11-22 RX ORDER — PHENTERMINE HYDROCHLORIDE 37.5 MG/1
37.5 CAPSULE ORAL
Qty: 30 CAPSULE | Refills: 2 | Status: SHIPPED | OUTPATIENT
Start: 2024-11-22 | End: 2025-02-20

## 2024-11-22 NOTE — PROGRESS NOTES
Patient ID: Arcelia Cook is a 64 y.o. female who presents for Follow-up.  HPI  The patient comes in for follow up.    She was last seen June 4, 2024.    She tried PSMF for management of insulin resistance with postprandial fatigue hunger fluid retention dyslipidemia hepatic steatosis.    She does not have a diagnosis of diabetes.    She has been following a more balanced approach and we added phentermine 37.5 mg.    She states that has helped her appetite.    She is had no difficulty tolerating it except for occasional insomnia and she does tend to get hungry by evening.    She has been working and was unable to follow-up until now.    She tried metformin but did not tolerate it in the past.    Physically she has no complaints.    ROS  Comprehensive review of systems is negative.    Objective   Physical Exam  Visit Vitals  /78   Pulse 74      Vitals:    11/22/24 1551   Weight: 85.7 kg (189 lb)      Body mass index is 35.13 kg/m².      Weight 189 down 3 pounds for total of 14    Eyes normal  ENT normal. No adenopathy  Thyroid palpable and normal. No nodules  Chest clear to auscultation  Heart sounds are normal  Abdomen nontender. Bowel sounds normal. No organomegaly  Feet are okay    Current Outpatient Medications   Medication Sig Dispense Refill    biotin 5 mg capsule Take as directed      cholecalciferol, vitamin D3, (VITAMIN D3 ORAL) Take 1 tablet by mouth once daily.      ergocalciferol (Vitamin D-2) 1.25 MG (95147 UT) capsule Take 1 capsule (50,000 Units) by mouth every 14 (fourteen) days.      fluticasone (Cutivate) 0.05 % cream Apply topically 2 times a day as needed.      multivitamin tablet Take 1 tablet by mouth once daily.      oxyCODONE-acetaminophen (Percocet) 5-325 mg tablet Take 1 tablet by mouth every 6 hours.      oxymetazoline (Afrin, oxymetazoline,) 0.05 % nasal spray Administer 2 sprays into each nostril 2 times a day as needed. Do not exceed use for more than 3 days      phentermine 37.5  mg capsule Take 1 capsule (37.5 mg) by mouth once daily in the morning. Take before meals. 30 capsule 2    pyridoxine (Vitamin B-6) 50 mg tablet Take 1 tablet (50 mg) by mouth once daily.      triamcinolone (Kenalog) 0.1 % cream        No current facility-administered medications for this visit.       Assessment/Plan     1.  Insulin resistance  2.  Dyslipidemia  3.  Fatigue  4.  Hepatic steatosis    We reviewed her blood work from June.    We again discussed insulin resistance and its impact.    We discussed other options.    She wants to stick with phentermine.    Will represcribe it and we discussed that since she was able to get in until now that despite the lapse hopefully the pharmacy will fill it.

## 2025-03-20 ENCOUNTER — APPOINTMENT (OUTPATIENT)
Dept: ENDOCRINOLOGY | Facility: CLINIC | Age: 65
End: 2025-03-20
Payer: MEDICARE

## 2025-03-21 ENCOUNTER — OFFICE VISIT (OUTPATIENT)
Dept: ENDOCRINOLOGY | Facility: CLINIC | Age: 65
End: 2025-03-21
Payer: MEDICARE

## 2025-03-21 VITALS
DIASTOLIC BLOOD PRESSURE: 78 MMHG | SYSTOLIC BLOOD PRESSURE: 122 MMHG | BODY MASS INDEX: 35.69 KG/M2 | WEIGHT: 192 LBS | HEART RATE: 76 BPM

## 2025-03-21 DIAGNOSIS — E78.5 DYSLIPIDEMIA: ICD-10-CM

## 2025-03-21 DIAGNOSIS — E88.810 METABOLIC SYNDROME: Primary | ICD-10-CM

## 2025-03-21 DIAGNOSIS — K76.0 HEPATIC STEATOSIS: ICD-10-CM

## 2025-03-21 PROCEDURE — 99214 OFFICE O/P EST MOD 30 MIN: CPT | Performed by: INTERNAL MEDICINE

## 2025-03-21 RX ORDER — CLOTRIMAZOLE AND BETAMETHASONE DIPROPIONATE 10; .64 MG/G; MG/G
1 CREAM TOPICAL 2 TIMES DAILY
COMMUNITY

## 2025-03-21 RX ORDER — BIMEKIZUMAB 160 MG/ML
INJECTION, SOLUTION SUBCUTANEOUS
COMMUNITY

## 2025-03-21 RX ORDER — CLOBETASOL PROPIONATE 0.5 MG/G
OINTMENT TOPICAL 2 TIMES DAILY
COMMUNITY

## 2025-03-21 NOTE — PROGRESS NOTES
Patient ID: Arcelia Cook is a 64 y.o. female who presents for Follow-up.  HPI  The patient comes in for follow up.    She tried PSMF for management of insulin resistance with postprandial fatigue hunger fluid retention dyslipidemia hepatic steatosis.    She does not have a diagnosis of diabetes.    She has been following a more balanced approach and we added phentermine 37.5 mg.    She states that has helped her appetite.    She is had no difficulty tolerating it except for occasional insomnia and she does tend to get hungry by evening.    She has not been exercising.    She tried metformin but did not tolerate it in the past.    Physically she has no complaints.      ROS  Comprehensive review of systems is negative.    Objective   Physical Exam  Visit Vitals  /78   Pulse 76      Vitals:    03/21/25 0926   Weight: 87.1 kg (192 lb)      Body mass index is 35.69 kg/m².      Weight 192 up 3 pounds still down 11    Eyes normal  ENT normal. No adenopathy  Thyroid palpable and normal. No nodules  Chest clear to auscultation  Heart sounds are normal  Abdomen nontender. Bowel sounds normal. No organomegaly  Feet are okay    Current Outpatient Medications   Medication Sig Dispense Refill    bimekizumab-bkzx (Bimzelx Autoinjector) 160 mg/mL auto-injector Inject under the skin.      biotin 5 mg capsule Take as directed      cholecalciferol, vitamin D3, (VITAMIN D3 ORAL) Take 1 tablet by mouth once daily.      clobetasol (Temovate) 0.05 % ointment Apply topically 2 times a day.      clotrimazole-betamethasone (Lotrisone) cream Apply 1 Application topically 2 times a day.      ergocalciferol (Vitamin D-2) 1.25 MG (87942 UT) capsule Take 1 capsule (50,000 Units) by mouth every 14 (fourteen) days.      fluticasone (Cutivate) 0.05 % cream Apply topically 2 times a day as needed.      multivitamin tablet Take 1 tablet by mouth once daily.      oxyCODONE-acetaminophen (Percocet) 5-325 mg tablet Take 1 tablet by mouth every 6  hours.      oxymetazoline (Afrin, oxymetazoline,) 0.05 % nasal spray Administer 2 sprays into each nostril 2 times a day as needed. Do not exceed use for more than 3 days      phentermine 37.5 mg capsule Take 1 capsule (37.5 mg) by mouth once daily in the morning. Take before meals. 30 capsule 2    pyridoxine (Vitamin B-6) 50 mg tablet Take 1 tablet (50 mg) by mouth once daily.      triamcinolone (Kenalog) 0.1 % cream        No current facility-administered medications for this visit.       Assessment/Plan     1.  Insulin resistance  2.  Dyslipidemia  3.  Hepatic steatosis    She will continue on the phentermine.    I have personally reviewed the OARRS report for this patient. This report is scanned into the electronic medical record. I consider the risks of abuse, dependence, addiction and diversion. I believe that is clinically appropriate for this patient to be prescribed this medication.    She will work on diet and exercise.    She will follow-up with me in 3 months sooner as needed.

## 2025-06-20 ENCOUNTER — APPOINTMENT (OUTPATIENT)
Dept: PRIMARY CARE | Facility: CLINIC | Age: 65
End: 2025-06-20
Payer: MEDICARE

## 2025-07-21 ENCOUNTER — OFFICE VISIT (OUTPATIENT)
Dept: URGENT CARE | Age: 65
End: 2025-07-21
Payer: MEDICARE

## 2025-07-21 VITALS
HEART RATE: 91 BPM | HEIGHT: 63 IN | SYSTOLIC BLOOD PRESSURE: 130 MMHG | OXYGEN SATURATION: 94 % | DIASTOLIC BLOOD PRESSURE: 83 MMHG | TEMPERATURE: 97.9 F | RESPIRATION RATE: 17 BRPM | BODY MASS INDEX: 33.66 KG/M2 | WEIGHT: 190 LBS

## 2025-07-21 DIAGNOSIS — N30.01 ACUTE CYSTITIS WITH HEMATURIA: Primary | ICD-10-CM

## 2025-07-21 LAB
POC APPEARANCE, URINE: CLEAR
POC BILIRUBIN, URINE: NEGATIVE
POC BLOOD, URINE: ABNORMAL
POC COLOR, URINE: YELLOW
POC GLUCOSE, URINE: NEGATIVE MG/DL
POC KETONES, URINE: NEGATIVE MG/DL
POC LEUKOCYTES, URINE: ABNORMAL
POC NITRITE,URINE: NEGATIVE
POC PH, URINE: 6 PH
POC PROTEIN, URINE: ABNORMAL MG/DL
POC SPECIFIC GRAVITY, URINE: 1.02
POC UROBILINOGEN, URINE: 0.2 EU/DL

## 2025-07-21 RX ORDER — CEPHALEXIN 500 MG/1
500 CAPSULE ORAL 2 TIMES DAILY
Qty: 14 CAPSULE | Refills: 0 | Status: SHIPPED | OUTPATIENT
Start: 2025-07-21 | End: 2025-07-28

## 2025-07-21 ASSESSMENT — ENCOUNTER SYMPTOMS
LOSS OF SENSATION IN FEET: 0
DEPRESSION: 0
OCCASIONAL FEELINGS OF UNSTEADINESS: 0

## 2025-07-21 ASSESSMENT — PATIENT HEALTH QUESTIONNAIRE - PHQ9
1. LITTLE INTEREST OR PLEASURE IN DOING THINGS: NOT AT ALL
SUM OF ALL RESPONSES TO PHQ9 QUESTIONS 1 AND 2: 0
2. FEELING DOWN, DEPRESSED OR HOPELESS: NOT AT ALL

## 2025-07-21 NOTE — PROGRESS NOTES
"Subjective   Patient ID: Arcelia Cook is a 65 y.o. female. They present today with a chief complaint of UTI (Pt complains of frequent urination, and pressure since Thursday. ).    History of Present Illness  Reports increased urinary frequency and a pressure over her bladder for the past 4 days.  States she otherwise feels well.  Denies abdominal or back pain, N/V, fever.      UTI      Past Medical History  Allergies as of 07/21/2025    (No Known Allergies)       Prescriptions Prior to Admission[1]     Medical History[2]    Surgical History[3]     reports that she has quit smoking. Her smoking use included cigarettes. She has never used smokeless tobacco.    Review of Systems  Pertinent systems reviewed and were negative unless otherwise stated in HPI.    Objective    Vitals:    07/21/25 1514   BP: 130/83   BP Location: Right arm   Patient Position: Sitting   BP Cuff Size: Adult   Pulse: 91   Resp: 17   Temp: 36.6 °C (97.9 °F)   TempSrc: Oral   SpO2: 94%   Weight: 86.2 kg (190 lb)   Height: 1.6 m (5' 3\")     No LMP recorded. Patient is postmenopausal.    Physical Exam  Constitutional:       General: She is not in acute distress.    Cardiovascular:      Rate and Rhythm: Normal rate.   Pulmonary:      Effort: Pulmonary effort is normal.   Abdominal:      Tenderness: There is no abdominal tenderness.     Skin:     Findings: No rash.     Psychiatric:         Mood and Affect: Mood normal.         Behavior: Behavior normal.         Diagnostic study results (if any) were reviewed by Fabian Goodman PA-C.    Assessment/Plan   Allergies, medications, history, and pertinent labs/EKGs/imaging reviewed by Fabian Goodman PA-C.     Medical Decision Making  Low concern for pyelonephritis or nephrolithiasis.     Orders and Diagnoses  Diagnoses and all orders for this visit:  Acute cystitis with hematuria  -     POCT UA Automated manually resulted  -     Urine Culture  -     cephalexin (Keflex) 500 mg capsule; Take 1 " capsule (500 mg) by mouth 2 times a day for 7 days.      Medical Admin Record      Disposition: Home    Electronically signed by Fabian Goodman PA-C       [1] (Not in a hospital admission)   [2]   Past Medical History:  Diagnosis Date    Personal history of other diseases of the respiratory system 11/24/2019    History of sore throat    Personal history of other diseases of urinary system     History of kidney disease    Personal history of other mental and behavioral disorders     History of depression    Personal history of other specified conditions     History of postoperative nausea   [3]   Past Surgical History:  Procedure Laterality Date    DILATION AND CURETTAGE OF UTERUS  06/15/2017    Dilation And Curettage    HYSTERECTOMY  09/21/2015    Hysterectomy    OTHER SURGICAL HISTORY  01/08/2020    Tubal ligation    TONSILLECTOMY  09/21/2015    Tonsillectomy

## 2025-07-22 ENCOUNTER — TELEPHONE (OUTPATIENT)
Dept: URGENT CARE | Age: 65
End: 2025-07-22

## 2025-07-24 LAB — BACTERIA UR CULT: ABNORMAL

## 2025-08-13 ENCOUNTER — APPOINTMENT (OUTPATIENT)
Dept: ENDOCRINOLOGY | Facility: CLINIC | Age: 65
End: 2025-08-13
Payer: MEDICARE